# Patient Record
Sex: MALE | Race: OTHER | NOT HISPANIC OR LATINO
[De-identification: names, ages, dates, MRNs, and addresses within clinical notes are randomized per-mention and may not be internally consistent; named-entity substitution may affect disease eponyms.]

---

## 2017-01-30 ENCOUNTER — APPOINTMENT (OUTPATIENT)
Dept: OTOLARYNGOLOGY | Facility: CLINIC | Age: 22
End: 2017-01-30

## 2017-01-30 ENCOUNTER — EMERGENCY (EMERGENCY)
Facility: HOSPITAL | Age: 22
LOS: 1 days | Discharge: HOME | End: 2017-01-30
Attending: EMERGENCY MEDICINE | Admitting: EMERGENCY MEDICINE
Payer: MEDICAID

## 2017-01-30 VITALS
HEIGHT: 67 IN | WEIGHT: 125 LBS | DIASTOLIC BLOOD PRESSURE: 74 MMHG | BODY MASS INDEX: 19.62 KG/M2 | HEART RATE: 79 BPM | SYSTOLIC BLOOD PRESSURE: 118 MMHG

## 2017-01-30 VITALS
HEART RATE: 91 BPM | SYSTOLIC BLOOD PRESSURE: 123 MMHG | OXYGEN SATURATION: 100 % | DIASTOLIC BLOOD PRESSURE: 58 MMHG | RESPIRATION RATE: 16 BRPM | TEMPERATURE: 99 F

## 2017-01-30 DIAGNOSIS — R22.0 LOCALIZED SWELLING, MASS AND LUMP, HEAD: ICD-10-CM

## 2017-01-30 DIAGNOSIS — Q18.1 PREAURICULAR SINUS AND CYST: ICD-10-CM

## 2017-01-30 LAB
ALBUMIN SERPL ELPH-MCNC: 4.5 G/DL — SIGNIFICANT CHANGE UP (ref 3.3–5)
ALP SERPL-CCNC: 53 U/L — SIGNIFICANT CHANGE UP (ref 40–120)
ALT FLD-CCNC: 53 U/L — HIGH (ref 4–41)
AST SERPL-CCNC: 51 U/L — HIGH (ref 4–40)
BASE EXCESS BLDV CALC-SCNC: 4 MMOL/L — SIGNIFICANT CHANGE UP
BASOPHILS # BLD AUTO: 0.02 K/UL — SIGNIFICANT CHANGE UP (ref 0–0.2)
BASOPHILS NFR BLD AUTO: 0.4 % — SIGNIFICANT CHANGE UP (ref 0–2)
BILIRUB SERPL-MCNC: 0.4 MG/DL — SIGNIFICANT CHANGE UP (ref 0.2–1.2)
BLOOD GAS VENOUS - CREATININE: 0.86 MG/DL — SIGNIFICANT CHANGE UP (ref 0.5–1.3)
BUN SERPL-MCNC: 10 MG/DL — SIGNIFICANT CHANGE UP (ref 7–23)
CALCIUM SERPL-MCNC: 9.5 MG/DL — SIGNIFICANT CHANGE UP (ref 8.4–10.5)
CHLORIDE BLDV-SCNC: 102 MMOL/L — SIGNIFICANT CHANGE UP (ref 96–108)
CHLORIDE SERPL-SCNC: 99 MMOL/L — SIGNIFICANT CHANGE UP (ref 98–107)
CO2 SERPL-SCNC: 23 MMOL/L — SIGNIFICANT CHANGE UP (ref 22–31)
CREAT SERPL-MCNC: 0.87 MG/DL — SIGNIFICANT CHANGE UP (ref 0.5–1.3)
EOSINOPHIL # BLD AUTO: 0.27 K/UL — SIGNIFICANT CHANGE UP (ref 0–0.5)
EOSINOPHIL NFR BLD AUTO: 5 % — SIGNIFICANT CHANGE UP (ref 0–6)
GAS PNL BLDV: 140 MMOL/L — SIGNIFICANT CHANGE UP (ref 136–146)
GLUCOSE BLDV-MCNC: 82 — SIGNIFICANT CHANGE UP (ref 70–99)
GLUCOSE SERPL-MCNC: 79 MG/DL — SIGNIFICANT CHANGE UP (ref 70–99)
HCO3 BLDV-SCNC: 26 MMOL/L — SIGNIFICANT CHANGE UP (ref 20–27)
HCT VFR BLD CALC: 42 % — SIGNIFICANT CHANGE UP (ref 39–50)
HCT VFR BLDV CALC: 44.7 % — SIGNIFICANT CHANGE UP (ref 39–51)
HGB BLD-MCNC: 14.5 G/DL — SIGNIFICANT CHANGE UP (ref 13–17)
HGB BLDV-MCNC: 14.6 G/DL — SIGNIFICANT CHANGE UP (ref 13–17)
IMM GRANULOCYTES NFR BLD AUTO: 0.2 % — SIGNIFICANT CHANGE UP (ref 0–1.5)
LACTATE BLDV-MCNC: 1.1 MMOL/L — SIGNIFICANT CHANGE UP (ref 0.5–2)
LYMPHOCYTES # BLD AUTO: 1.48 K/UL — SIGNIFICANT CHANGE UP (ref 1–3.3)
LYMPHOCYTES # BLD AUTO: 27.6 % — SIGNIFICANT CHANGE UP (ref 13–44)
MCHC RBC-ENTMCNC: 31.6 PG — SIGNIFICANT CHANGE UP (ref 27–34)
MCHC RBC-ENTMCNC: 34.5 % — SIGNIFICANT CHANGE UP (ref 32–36)
MCV RBC AUTO: 91.5 FL — SIGNIFICANT CHANGE UP (ref 80–100)
MONOCYTES # BLD AUTO: 0.54 K/UL — SIGNIFICANT CHANGE UP (ref 0–0.9)
MONOCYTES NFR BLD AUTO: 10.1 % — SIGNIFICANT CHANGE UP (ref 2–14)
NEUTROPHILS # BLD AUTO: 3.04 K/UL — SIGNIFICANT CHANGE UP (ref 1.8–7.4)
NEUTROPHILS NFR BLD AUTO: 56.7 % — SIGNIFICANT CHANGE UP (ref 43–77)
PCO2 BLDV: 52 MMHG — HIGH (ref 41–51)
PH BLDV: 7.37 PH — SIGNIFICANT CHANGE UP (ref 7.32–7.43)
PLATELET # BLD AUTO: 221 K/UL — SIGNIFICANT CHANGE UP (ref 150–400)
PMV BLD: 9.4 FL — SIGNIFICANT CHANGE UP (ref 7–13)
PO2 BLDV: 27 MMHG — LOW (ref 35–40)
POTASSIUM BLDV-SCNC: 4 MMOL/L — SIGNIFICANT CHANGE UP (ref 3.4–4.5)
POTASSIUM SERPL-MCNC: 3.8 MMOL/L — SIGNIFICANT CHANGE UP (ref 3.5–5.3)
POTASSIUM SERPL-SCNC: 3.8 MMOL/L — SIGNIFICANT CHANGE UP (ref 3.5–5.3)
PROT SERPL-MCNC: 7.8 G/DL — SIGNIFICANT CHANGE UP (ref 6–8.3)
RBC # BLD: 4.59 M/UL — SIGNIFICANT CHANGE UP (ref 4.2–5.8)
RBC # FLD: 12.6 % — SIGNIFICANT CHANGE UP (ref 10.3–14.5)
SAO2 % BLDV: 41.6 % — LOW (ref 60–85)
SODIUM SERPL-SCNC: 141 MMOL/L — SIGNIFICANT CHANGE UP (ref 135–145)
WBC # BLD: 5.36 K/UL — SIGNIFICANT CHANGE UP (ref 3.8–10.5)
WBC # FLD AUTO: 5.36 K/UL — SIGNIFICANT CHANGE UP (ref 3.8–10.5)

## 2017-01-30 PROCEDURE — 99284 EMERGENCY DEPT VISIT MOD MDM: CPT

## 2017-01-30 RX ORDER — SODIUM CHLORIDE 9 MG/ML
1000 INJECTION INTRAMUSCULAR; INTRAVENOUS; SUBCUTANEOUS ONCE
Qty: 0 | Refills: 0 | Status: COMPLETED | OUTPATIENT
Start: 2017-01-30 | End: 2017-01-30

## 2017-01-30 RX ADMIN — SODIUM CHLORIDE 1000 MILLILITER(S): 9 INJECTION INTRAMUSCULAR; INTRAVENOUS; SUBCUTANEOUS at 18:47

## 2017-01-30 NOTE — ED PROVIDER NOTE - OBJECTIVE STATEMENT
22 y/o M with no pertinent medical history presents with abdominal pain and diarrhea x 3 days. Patient states that right now he does not have any abdominal pain and has not had any diarrhea today. Denies any sick contacts, no recent travel and no abx use. Patient has not had any blood in stool.

## 2017-01-30 NOTE — ED PROVIDER NOTE - ATTENDING CONTRIBUTION TO CARE
Case of a 20 y/o male patient with no pertinent medical history presented with abdominal pain and episodes of diarrhea. Patient well hydrated with no abdominal pain or tenderness upon evaluation. Symptoms most likely due to gastroenteritis, will check labs to r/o any electrolyte abnormality, hydrate and reassess. Agree with resident's physical exam, assessment and documentation

## 2017-01-30 NOTE — ED PROVIDER NOTE - MEDICAL DECISION MAKING DETAILS
22 y/o M with no pertinent medical history presents with abd pain and diarrhea x 3 days. Patient well appearing and has benign belly exam. Likely gastroenteritis. Will check basic labs and IVF

## 2017-01-30 NOTE — ED ADULT TRIAGE NOTE - CHIEF COMPLAINT QUOTE
pt c/o abd pain and diarrhea x 3 days. denies fever/chills. pt states has had similar incidents in the past but was never diagnosed with anything.

## 2017-04-07 ENCOUNTER — EMERGENCY (EMERGENCY)
Facility: HOSPITAL | Age: 22
LOS: 1 days | Discharge: HOME | End: 2017-04-07
Attending: EMERGENCY MEDICINE | Admitting: EMERGENCY MEDICINE
Payer: MEDICAID

## 2017-04-07 VITALS
SYSTOLIC BLOOD PRESSURE: 118 MMHG | TEMPERATURE: 98 F | HEART RATE: 72 BPM | DIASTOLIC BLOOD PRESSURE: 76 MMHG | RESPIRATION RATE: 16 BRPM | OXYGEN SATURATION: 100 %

## 2017-04-07 DIAGNOSIS — F12.10 CANNABIS ABUSE, UNCOMPLICATED: ICD-10-CM

## 2017-04-07 DIAGNOSIS — F43.20 ADJUSTMENT DISORDER, UNSPECIFIED: ICD-10-CM

## 2017-04-07 DIAGNOSIS — F13.10 SEDATIVE, HYPNOTIC OR ANXIOLYTIC ABUSE, UNCOMPLICATED: ICD-10-CM

## 2017-04-07 PROCEDURE — 90792 PSYCH DIAG EVAL W/MED SRVCS: CPT | Mod: GC

## 2017-04-07 PROCEDURE — 99284 EMERGENCY DEPT VISIT MOD MDM: CPT

## 2017-04-07 NOTE — ED BEHAVIORAL HEALTH ASSESSMENT NOTE - MODIFICATIONS
I interviewed the patient myself and discussed this patient with Dr. Garcia and agree with her assessment and plan, my own findings appear below:

## 2017-04-07 NOTE — ED BEHAVIORAL HEALTH ASSESSMENT NOTE - SAFETY PLAN DETAILS
Patient is to return to ED or call 911 if symptoms worsen or if endorsing suicidal/homicidal ideation, intent or plan

## 2017-04-07 NOTE — ED ADULT TRIAGE NOTE - CHIEF COMPLAINT QUOTE
brought in by EMS from home. Ex-girlfriend called 911, stating that pt voiced to her that he wanted to kill himself. Pt denies saying anything of that sort. Pt calm cooperative. No past medical hx, no past psych hx. As per EMS, by law they cannot keep him in his home and had to bring pt here for eval. Pt denies any complaints
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
## 2017-04-07 NOTE — ED PROVIDER NOTE - NS ED MD SCRIBE ATTENDING SCRIBE SECTIONS
DISPOSITION/VITAL SIGNS( Pullset)/PHYSICAL EXAM/HISTORY OF PRESENT ILLNESS/PAST MEDICAL/SURGICAL/SOCIAL HISTORY/HIV/REVIEW OF SYSTEMS

## 2017-04-07 NOTE — ED BEHAVIORAL HEALTH ASSESSMENT NOTE - DETAILS
currently with mother Per ex-gf, patient often makes statements like "I'm going to kill myself if we aren't together" however has not acted on them in the past family/ex-gf aware

## 2017-04-07 NOTE — ED BEHAVIORAL HEALTH ASSESSMENT NOTE - RISK ASSESSMENT
The patient's risk factors include substance use, legal hx, poor coping skills, psychosocial stressors and lack of current treatment. Protective factors include no prior psychiatric hospitalizations, no hx of SA, domiciled, good family support, enrolled in school, employed, future oriented and treatment seeking. At this time, patient's protective factors outweigh his risk factors and he does not appear to be an imminent risk of harm to himself and/or others.

## 2017-04-07 NOTE — ED PROVIDER NOTE - OBJECTIVE STATEMENT
20 y/o M, no sig PMHx, brought in by [facility] due to girlfriend calling [facility] stating pt was suicidal. Denies CP, abdominal pain, toxic overdoses, SI/HI, and other complaints.

## 2017-04-07 NOTE — ED BEHAVIORAL HEALTH ASSESSMENT NOTE - CASE SUMMARY
Mr. 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 Collateral obtained by Dr. Garcia suggests patient is having a difficult time adjusting to breakup with former GF, but does not have history of suicidality nor has he expressed any plans. Patient is generally minimizing, but presents with organized thought process and denies AVH/HI/SI and denies hx of SA. he is not interested in inpatient level of care nor does he desire  referral. He is working on finding referral for therapy through his insurance company. Provided brief counselling on substance use. Father does not have acute safety concern and is comfortable taking pt home. both father and patient agree to call 911/return to ED for SI/HI.

## 2017-04-07 NOTE — ED BEHAVIORAL HEALTH ASSESSMENT NOTE - SUMMARY
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  On interview, patient is calm/cooperative, and reports that over the past month he has been stressed about his recent break up and overwhelming school work, which has led him to have some difficulty sleeping, crying and anxiety. However, patient states that he is not overtly depressed and consistently denies any passive or active SIIP. States that he has been using marijuana/xanax however feels he does it to enjoy rather than cope with stress. Per father and ex-gf, patient has been more down recently and they have noticed that he does use more marijuana, which is likely to cope with the stress. Deny that patient has ever tried to hurt himself and per ex-gf, patient has made these statements in a threatening manner ("I will kill myself if we aren't together") but denies that patient has ever acted on them. Patient is in agreement that he may need some counseling/therapy and is open to the idea. Pt denies any overt depressive, manic or psychotic sx. No SIIP/HIIP. It appears that patient has low frustration tolerance, dependency traits, poor coping skills and affective instability, which may be contributing to current presentation. Patient was offered voluntary hospitalization which he refused. Pt also offered  referral however refused, stating that he has already called his insurance to find out who is covered. At this time, patient is not an imminent risk of harm to himself and/or others and does not require involuntary hospitalization for further stabilization. 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  On interview, patient is calm/cooperative, and reports that over the past month he has been stressed about his recent break up and overwhelming school work, which has led him to have some difficulty sleeping, crying and anxiety. However, patient states that he is not overtly depressed and consistently denies any passive or active SIIP. States that he has been using marijuana/xanax however feels he does it to enjoy rather than cope with stress. Per father and ex-gf, patient has been more down recently and they have noticed that he does use more marijuana, which is likely to cope with the stress. Deny that patient has ever tried to hurt himself and per ex-gf, patient has made these statements in a threatening manner ("I will kill myself if we aren't together") but denies that patient has ever acted on them. Patient is in agreement that he may need some counseling/therapy and is open to the idea. Pt denies any overt depressive, manic or psychotic sx. No SIIP/HIIP. It appears that patient has low frustration tolerance, dependency traits, poor coping skills and affective instability, which may be contributing to current presentation. Patient was offered voluntary hospitalization which he refused. Pt also offered  referral however refused, stating that he has already called his insurance to find out who is covered. At this time, patient is not an imminent risk of harm to himself and/or others and does not require involuntary hospitalization for further stabilization.

## 2017-04-07 NOTE — ED BEHAVIORAL HEALTH ASSESSMENT NOTE - REFERRAL / APPOINTMENT DETAILS
Pt offered  referral but refused. States that he has already called his insurance company for referrals. Provided [facility] contact [phone] for outpt follow up if insurance company does not work out

## 2017-04-24 ENCOUNTER — APPOINTMENT (OUTPATIENT)
Dept: OTOLARYNGOLOGY | Facility: CLINIC | Age: 22
End: 2017-04-24

## 2020-01-01 ENCOUNTER — OUTPATIENT (OUTPATIENT)
Dept: OUTPATIENT SERVICES | Facility: HOSPITAL | Age: 25
LOS: 1 days | End: 2020-01-01
Payer: MEDICAID

## 2020-01-01 PROCEDURE — G9001: CPT

## 2020-01-23 ENCOUNTER — EMERGENCY (EMERGENCY)
Facility: HOSPITAL | Age: 25
LOS: 1 days | Discharge: HOME | End: 2020-01-23
Attending: EMERGENCY MEDICINE | Admitting: EMERGENCY MEDICINE
Payer: MEDICAID

## 2020-01-23 VITALS
SYSTOLIC BLOOD PRESSURE: 142 MMHG | HEART RATE: 112 BPM | RESPIRATION RATE: 18 BRPM | OXYGEN SATURATION: 100 % | DIASTOLIC BLOOD PRESSURE: 78 MMHG | TEMPERATURE: 98 F

## 2020-01-23 VITALS
SYSTOLIC BLOOD PRESSURE: 146 MMHG | DIASTOLIC BLOOD PRESSURE: 77 MMHG | TEMPERATURE: 98 F | OXYGEN SATURATION: 100 % | RESPIRATION RATE: 18 BRPM | HEART RATE: 89 BPM

## 2020-01-23 PROCEDURE — 99283 EMERGENCY DEPT VISIT LOW MDM: CPT

## 2020-01-23 RX ORDER — IVERMECTIN 3 MG/1
12 TABLET ORAL
Qty: 8 | Refills: 0
Start: 2020-01-23

## 2020-01-23 RX ORDER — PERMETHRIN CREAM 5% W/W 50 MG/G
1 CREAM TOPICAL
Qty: 1 | Refills: 0
Start: 2020-01-23 | End: 2020-01-23

## 2020-01-23 NOTE — ED ADULT TRIAGE NOTE - CHIEF COMPLAINT QUOTE
Pt coming into ED for evaluation of a pruritic rash spreading across his whole body, pt states the rash started in his abdomen. Pt was seen last week for this rash and was prescribed, pepcid, benadryl and prednisone with no relief of symptoms.

## 2020-01-23 NOTE — ED PROVIDER NOTE - ATTENDING CONTRIBUTION TO CARE
DR. BLOCH, ATTENDING MD-  I performed a face to face bedside interview with patient regarding history of present illness, review of symptoms and past medical history. I completed an independent physical exam.  I have discussed patient's plan of care with the resident.  Patient examined, well appearing NAD HEENT nml lungs clear, abd soft nontender. skin with excoriated, macular papular rash  worse in intertrigo area and buttocks DR. BLOCH, ATTENDING MD-  I performed a face to face bedside interview with patient regarding history of present illness, review of symptoms and past medical history. I completed an independent physical exam.  I have discussed patient's plan of care with the resident.  Patient examined, well appearing NAD HEENT nml lungs clear, abd soft nontender. skin with excoriated, macular papular rash  worse in intertrigo area and buttocks.

## 2020-01-23 NOTE — ED ADULT NURSE NOTE - OBJECTIVE STATEMENT
24 year old male presents to the ED with c/o rash to his entire body except his face x 2 weeks was seen for the rash and given meds without relief

## 2020-01-23 NOTE — ED ADULT NURSE NOTE - NSIMPLEMENTINTERV_GEN_ALL_ED
Implemented All Universal Safety Interventions:  [place] to call system. Call bell, personal items and telephone within reach. Instruct patient to call for assistance. Room bathroom lighting operational. Non-slip footwear when patient is off stretcher. Physically safe environment: no spills, clutter or unnecessary equipment. Stretcher in lowest position, wheels locked, appropriate side rails in place.

## 2020-01-23 NOTE — ED ADULT NURSE NOTE - CAS DISCH ACCOMP BY
Problem: Pain:  Goal: Control of chronic pain  Description  Control of chronic pain  Outcome: Met This Shift  Note:   Patient describes pain as more of ache and states usually doesn't even take anything for it  Intervention: Opioid analgesia side-effects  Note:   Patient only using plain tylenol and or motrin for pain     Problem: Musculor/Skeletal Functional Status  Goal: Absence of falls  Outcome: Met This Shift  Note:   No falls this admission   Intervention: Fall precautions  Note:   Patient aware of fall precautions for here and at home -call light in reach while here       Problem: Intellectual/Education/Knowledge Deficit  Goal: Teaching initiated upon admission  Outcome: Met This Shift  Note:   Chemotherapy Teaching     What is Chemotherapy   Drug action ? Method of Administration ? Handouts given ? Side Effects  Nausea/vomiting ? Diarrhea ? Fatigue ? Signs / Symptoms of infection ? Neutropenia ? Thrombocytopenia ? Alopecia ? neuropathy ? Jack diet &  the importance of fluids ? Micellaneous  Importance of nutrition ? Importance of oral hygiene ? When to call the MD ?   Monitoring labs ? Use of supportive services ? Explanation of Drug Regimen / Frequency  Velcade subcutaneous      Comments  Verbalized understanding to drug,action,side effects and when to call MD      Goal: Written Disposition Instruction form completed  Outcome: Met This Shift  Note:   Discharge instructions given and reviewed with patient. All questions answered. Patient verbalized understanding   Intervention: Verbal/written education provided  Note:   Discharge instruction sheets     Problem: Discharge Planning  Goal: Knowledge of discharge instructions  Description  Knowledge of discharge instructions     Outcome: Met This Shift  Note:   Patient and family member able to teach back follow up appointments and when to call the doctor.  Patient offers no questions at this time   Intervention: Interaction with patient/family and care team  Note:   Patient and family currently denies any needs or concerns    Intervention: Discharge to appropriate level of care  Note:   Discharge home Self

## 2020-01-23 NOTE — ED PROVIDER NOTE - PHYSICAL EXAMINATION
GENERAL: anxious appearing  HEAD: normocephalic, atraumatic  HEENT: normal conjunctiva, oral mucosa moist without any signs of lesions, neck supple  CARDIAC: regular rate and rhythm, normal S1 and S2,  no appreciable murmurs  PULM: clear to ascultation bilaterally  GI: abdomen nondistended, soft, nontender, no guarding or rebound tenderness  : no CVA tenderness, no suprapubic tenderness  NEURO: alert and oriented x 3, normal speech, PERRL, no focal motor or sensory deficits, nonantalgic gait  MSK: no visible deformities, no peripheral edema, calf tenderness/redness/swelling  SKIN: diffuse maculopapular rash with evidence of excoriation sparing palms and soles, erythematous/lichenified rash at cleft of buttocks with excoriations, maculopapular rash involving groin with pupules vs. vesicles on head of penis  PSYCH: pressured speech, anxious appearing

## 2020-01-23 NOTE — ED PROVIDER NOTE - CLINICAL SUMMARY MEDICAL DECISION MAKING FREE TEXT BOX
24M p/w diffuse pruritic rash for past two weeks worsening despite prednisone/benadryl, worse at night and in morning, sparing palms and soles and mucosa, worst in cleft of buttocks and with papules on head of penis that aren't painful. Vitals reassuring. Possibly fungal as pt states it's gotten worse despite prednisone though doesn't appear to be candidiasis. Possibly scabies vs. bed bugs. Possibly secondary syphillis vs. HIV related vs HSV vs. GC/CT related. Will do those tests. Try to contact derm.
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
## 2020-01-23 NOTE — CHART NOTE - NSCHARTNOTEFT_GEN_A_CORE
Dermatology Brief Note    HPI: 24 year old M presenting with very itchy rash that started on abdomen 2 weeks ago and  spread to groin, buttock, and thighs. Patient went to [facility] ED 1 week ago and received prednisone, famotidine, and benadryl without improvement. Nobody else at home itchy as per patient.     PHYSICAL EXAM:  Vital Signs Last 24 Hrs  T(C): 36.9 (23 Jan 2020 16:20), Max: 36.9 (23 Jan 2020 12:36)  T(F): 98.4 (23 Jan 2020 16:20), Max: 98.5 (23 Jan 2020 12:36)  HR: 89 (23 Jan 2020 16:20) (89 - 112)  BP: 146/77 (23 Jan 2020 16:20) (142/78 - 146/77)  BP(mean): --  RR: 18 (23 Jan 2020 16:20) (18 - 18)  SpO2: 100% (23 Jan 2020 16:20) (100% - 100%)    Skin exam notable for:  The patient was alert and oriented X 3, well nourished, and in no apparent distress. Oropharynx showed no ulcerations. There was no visible lymphadenopathy. Conjunctiva were non-injected. There was no clubbing or edema of extremities.    The scalp, hair, face, eyebrows, lips, oropharynx , neck, chest, back, buttocks, extremities X 4, hands, feet, nails were examined. There was no hyperhidrosis or bromhidrosis.     The following lesions are noted:   multiple erythematous papules with excoriations on penis, scrotum, buttock/intergluteal cleft, abdomen, umbilicus, thighs    ASSESSMENT/PLAN:  #Scabies  - Start permethrin cream, apply from neck down at night (leave on for 8-12 hours prior to rinsing), repeat application in 1 week  - Start ivermectin 200mcg/kg x 1 dose, repeat dose in 1 week  - Clothing and bedding should be washed in hot water and dried under high heat     Discussed with primary team.  Discussed remotely with attending, Dr. Cierra Junior.     Rachel Smith MD  PGY3, Dermatology

## 2020-01-23 NOTE — ED PROVIDER NOTE - NSFOLLOWUPINSTRUCTIONS_ED_ALL_ED_FT
Your diagnosis: Rash (suspected Scabies)    Discharge instructions:    - Please follow up with your Primary Care Doctor and/or Dermatologist.    - Take prescribed medications as indicated:      - Permethrin 5% cream applied to entire body below neck and wash off after 8-14 hours THEN repeat in 1 week.       - Ivermectin 12mg once this week THEN repeat same dose in 1 week.     - Wash clothing and bedding in hot water, dry-clean or place in airtight bag for 72 hours.    - Be sure to return to the ED if you develop new or worsening symptoms. Specific signs and symptoms to be vigilant of: fever or chills, chest pain, difficulty breathing, palpitations, loss of consciousness, headache, vision changes, slurred speech, difficulty swallowing or drooling, facial droop, weakness in the arms or legs, numbness or tingling, abdominal pain, nausea or vomiting, diarrhea, constipation, blood in the stool or urine, pain on urination, difficulty urinating or any other distressing symptoms.

## 2020-01-23 NOTE — ED PROVIDER NOTE - PATIENT PORTAL LINK FT
You can access the FollowMyHealth Patient Portal offered by [facility] by registering at the following website: [email or web address]. By joining [employer]’s FollowMyHealth portal, you will also be able to view your health information using other applications (apps) compatible with our system.

## 2020-01-23 NOTE — SBIRT NOTE ADULT - NSSBIRTBRIEFINTDET_GEN_A_CORE
Provided SBIRT services: Full screen positive. Brief Intervention Performed. Screening results were reviewed with the patient and patient was provided information about healthy guidelines and potential negative consequences associated with level of risk. Motivation and readiness to reduce or stop use was discussed and goals and activities to make changes were suggested/offered.  Provided pt with information for Bottle Cap- online program to help pts reduce/stop drinking

## 2020-01-24 DIAGNOSIS — Z71.89 OTHER SPECIFIED COUNSELING: ICD-10-CM

## 2020-01-24 LAB — HIV 1+2 AB+HIV1 P24 AG SERPL QL IA: SIGNIFICANT CHANGE UP

## 2020-01-24 RX ORDER — IVERMECTIN 3 MG/1
12 TABLET ORAL
Qty: 8 | Refills: 0
Start: 2020-01-24

## 2020-01-24 RX ORDER — PERMETHRIN CREAM 5% W/W 50 MG/G
1 CREAM TOPICAL
Qty: 1 | Refills: 0
Start: 2020-01-24 | End: 2020-01-24

## 2020-01-24 NOTE — ED POST DISCHARGE NOTE - REASON FOR FOLLOW-UP
Other Pt called rx not at pharmacy for permethrin and ivermectin. Re Erx prescriptions as directed by provider. status shows successful transmission

## 2020-01-30 ENCOUNTER — APPOINTMENT (OUTPATIENT)
Dept: DERMATOLOGY | Facility: CLINIC | Age: 25
End: 2020-01-30

## 2020-02-01 ENCOUNTER — OUTPATIENT (OUTPATIENT)
Dept: OUTPATIENT SERVICES | Facility: HOSPITAL | Age: 25
LOS: 1 days | End: 2020-02-01

## 2020-02-19 DIAGNOSIS — Z71.89 OTHER SPECIFIED COUNSELING: ICD-10-CM

## 2022-12-07 ENCOUNTER — EMERGENCY (EMERGENCY)
Facility: HOSPITAL | Age: 27
LOS: 1 days | Discharge: HOME | End: 2022-12-07
Attending: EMERGENCY MEDICINE
Payer: COMMERCIAL

## 2022-12-07 VITALS
WEIGHT: 130.07 LBS | DIASTOLIC BLOOD PRESSURE: 63 MMHG | TEMPERATURE: 100 F | RESPIRATION RATE: 20 BRPM | HEART RATE: 92 BPM | OXYGEN SATURATION: 96 % | SYSTOLIC BLOOD PRESSURE: 104 MMHG | HEIGHT: 67 IN

## 2022-12-07 PROCEDURE — 99284 EMERGENCY DEPT VISIT MOD MDM: CPT

## 2022-12-07 PROCEDURE — 0225U NFCT DS DNA&RNA 21 SARSCOV2: CPT

## 2022-12-07 RX ORDER — ONDANSETRON 8 MG/1
4 TABLET, FILM COATED ORAL ONCE
Refills: 0 | Status: COMPLETED | OUTPATIENT
Start: 2022-12-07 | End: 2022-12-07

## 2022-12-07 RX ORDER — ACETAMINOPHEN 500 MG
650 TABLET ORAL ONCE
Refills: 0 | Status: COMPLETED | OUTPATIENT
Start: 2022-12-07 | End: 2022-12-07

## 2022-12-07 RX ADMIN — ONDANSETRON 4 MILLIGRAM(S): 8 TABLET, FILM COATED ORAL at 22:57

## 2022-12-07 RX ADMIN — Medication 650 MILLIGRAM(S): at 22:57

## 2022-12-07 NOTE — ED PROVIDER NOTE - OBJECTIVE STATEMENT
27-year-old male with no past medical history [facility] ED complaining of fever, body aches, nausea and severe back pain for last 4 days.  Denies vomiting, chest pain, shortness of breath, abdominal pain, vomiting, dysuria, diarrhea.  Patient is active smoker.

## 2022-12-07 NOTE — ED PROVIDER NOTE - PATIENT PORTAL LINK FT
You can access the FollowMyHealth Patient Portal offered by [facility] by registering at the following website: [email or web address]. By joining [employer]’s FollowMyHealth portal, you will also be able to view your health information using other applications (apps) compatible with our system.

## 2022-12-07 NOTE — ED PROVIDER NOTE - PHYSICAL EXAMINATION
GEN: Patient awake alert NAD.   HEENT: normocephalic, atraumatic, moist MM  CARDIAC: RRR, S1, S2, no murmur.   PULM: CTA B/L no wheeze, rhonchi, rales.   ABD: soft NT, ND, no rebound no guarding, no CVA tenderness.   MSK: Moving all extremities, no edema.    NEURO: A&Ox3, no focal neurological deficits  SKIN: warm, dry, no rash.

## 2022-12-07 NOTE — ED PROVIDER NOTE - ATTENDING CONTRIBUTION TO CARE
Pt with URI sx - exam is benign with unremarkable vitals, no signs of respiratory compromise, hypoxia, or sepsis. Pt tested + for influenza. No indication for inpatient admission at this point. Pt given supportive care instructions and isolation instructions pt is well appearing and stable for d/c with supportive care as outpt.

## 2022-12-07 NOTE — ED PROVIDER NOTE - NS ED ROS FT
GENERAL: + fever, chills  EYES: no vision changes, no discharge.   ENT: no difficulty swallowing or speaking   CARDIAC: no chest pain/pressure, SOB, lower extremity swelling  PULMONARY: no cough, SOB  GI: no abdominal pain, v/d, + nausea  : no dysuria, no hematuria  SKIN: no rashes, no ecchymosis  NEURO: no headache, lightheadedness  MSK: No joint pain, +myalgia, weakness.

## 2022-12-07 NOTE — ED ADULT TRIAGE NOTE - CHIEF COMPLAINT QUOTE
C/o chills, body aches, non productive cough, L flank pain x1 day. Endorses fever (max 100.7). Denies  symptoms

## 2022-12-07 NOTE — ED PROVIDER NOTE - NSFOLLOWUPINSTRUCTIONS_ED_ALL_ED_FT
You were evaluated in the Emergency Department for your symptoms. You have influenza causing an upper respiratory infection.     While you may continue to feel sick for a few days,  fortunatelly there is no need to be hospitalized at this time and you are safe to continue your treatment at home.    We recommend that you:  1. Continue your home medications as prescribed.  2. Take Tylenol, 2 extra strength tablets, up to every 6 hours as needed for pain or any fever.  3. Drink plenty of fluids.  4. Call your primary care doctor tomorrow for follow-up.  5. Avoid contact with people, wash your hands thoroughly and often, and cover your mouth when coughing or sneezing to prevent the spread of the illness to others.    *** Return immediately (or call 911) if you have increased difficulty breathing, vomiting, chest pain, or develop other new/concerning symptoms. ***

## 2022-12-07 NOTE — ED PROVIDER NOTE - RAPID ASSESSMENT
28 y/o M here for 4 day fever (TMAX 100.4 F), chills, body ache, and nausea, along with lower back pain today. Endorsed Tylenol PTA. Has a daughter at home with RSVP. No other acute complaints. SANDEE.     Junie CANDELARIO (Scribkurt) have documented this rapid assessment note under the dictation of Eri Perry) which has been reviewed and affirmed to be accurate. Patient was seen as a QDOC patient. The patient will be seen and further worked up in the main emergency department and their care will be completed by the main emergency department team along with a thorough physical exam. Receiving team will follow up on labs, analgesia, any clinical imaging, reassess and disposition as clinically indicated, all decisions regarding the progression of care will be made at their discretion.

## 2022-12-08 VITALS
DIASTOLIC BLOOD PRESSURE: 65 MMHG | TEMPERATURE: 99 F | OXYGEN SATURATION: 99 % | SYSTOLIC BLOOD PRESSURE: 112 MMHG | RESPIRATION RATE: 17 BRPM | HEART RATE: 88 BPM

## 2022-12-08 LAB
FLUAV H1 2009 PAND RNA SPEC QL NAA+PROBE: DETECTED
RAPID RVP RESULT: DETECTED
SARS-COV-2 RNA SPEC QL NAA+PROBE: SIGNIFICANT CHANGE UP

## 2022-12-08 RX ORDER — ONDANSETRON 8 MG/1
1 TABLET, FILM COATED ORAL
Qty: 12 | Refills: 0
Start: 2022-12-08

## 2022-12-08 NOTE — ED ADULT NURSE NOTE - OBJECTIVE STATEMENT
Pt received AA+O x 4, presents to ED c/o Fever, chills, lower back pain, and cough for 3 days that worsened today.

## 2022-12-08 NOTE — ED ADULT NURSE NOTE - ALCOHOL PRE SCREEN (AUDIT - C)
[facility] Prior Authorization Team   Phone: [phone]  Fax: [phone]    PA Initiation    Medication: PULMOZYME 1 MG/ML neb solution  Insurance Company: [employer] - Phone [phone] Fax [phone]  Pharmacy Filling the Rx: [facility] - [place], [place] - [facility] [address]  Filling Pharmacy Phone: [phone]  Filling Pharmacy Fax: [phone]  Start Date: 12/13/2017       Statement Selected

## 2022-12-08 NOTE — ED ADULT NURSE NOTE - NS ED NOTE  TALK SOMEONE YN
Patient to lab for Q4 week lab h/h  and possible Aranesp injection. Also renal fxn panel per Dr Coty Solitario  Pt is not on dialysis, he has kidney disease, anemia. Sees Dr Coty Solitario. Periph lab drawn, tolerated well    HGB = 10.0  Patient has complaint mild fatigue  Chronic pains; has some generalized pain though d/t stressors in life, chronic body aches x 25 yrs  Emotional support offered. Rates to lower legs, back =4- 5/10 now. Aranesp inj given today left upper arm SQ - to be given if Hgl is less than 11. Inj tolerated well. Will return in 4 weeks. Patient verbalizes understanding of Aranesp injection and HH monitoring prior to each dose.      Discharged stable & ambulatory with cane, with next appointments, provided updated AVS.
No

## 2022-12-08 NOTE — ED ADULT NURSE NOTE - NSICDXPASTMEDICALHX_GEN_ALL_CORE_FT
PAST MEDICAL HISTORY:  No pertinent past medical history     Seasonal allergies     Vitamin D deficiency

## 2022-12-15 ENCOUNTER — EMERGENCY (EMERGENCY)
Facility: HOSPITAL | Age: 27
LOS: 1 days | Discharge: HOME | End: 2022-12-15
Attending: STUDENT IN AN ORGANIZED HEALTH CARE EDUCATION/TRAINING PROGRAM
Payer: COMMERCIAL

## 2022-12-15 VITALS
RESPIRATION RATE: 16 BRPM | DIASTOLIC BLOOD PRESSURE: 73 MMHG | SYSTOLIC BLOOD PRESSURE: 123 MMHG | TEMPERATURE: 98 F | HEIGHT: 67 IN | WEIGHT: 130.07 LBS | OXYGEN SATURATION: 99 % | HEART RATE: 96 BPM

## 2022-12-15 PROCEDURE — 71045 X-RAY EXAM CHEST 1 VIEW: CPT | Mod: 26

## 2022-12-15 PROCEDURE — 99284 EMERGENCY DEPT VISIT MOD MDM: CPT

## 2022-12-15 PROCEDURE — 71045 X-RAY EXAM CHEST 1 VIEW: CPT

## 2022-12-15 PROCEDURE — 99283 EMERGENCY DEPT VISIT LOW MDM: CPT | Mod: 25

## 2022-12-15 RX ORDER — DIAZEPAM 5 MG
2 TABLET ORAL ONCE
Refills: 0 | Status: DISCONTINUED | OUTPATIENT
Start: 2022-12-15 | End: 2022-12-15

## 2022-12-15 RX ORDER — ACETAMINOPHEN 500 MG
975 TABLET ORAL ONCE
Refills: 0 | Status: COMPLETED | OUTPATIENT
Start: 2022-12-15 | End: 2022-12-15

## 2022-12-15 RX ORDER — IBUPROFEN 200 MG
600 TABLET ORAL ONCE
Refills: 0 | Status: COMPLETED | OUTPATIENT
Start: 2022-12-15 | End: 2022-12-15

## 2022-12-15 RX ADMIN — Medication 2 MILLIGRAM(S): at 03:56

## 2022-12-15 RX ADMIN — Medication 975 MILLIGRAM(S): at 03:56

## 2022-12-15 RX ADMIN — Medication 600 MILLIGRAM(S): at 03:56

## 2022-12-15 NOTE — ED PROVIDER NOTE - PATIENT PORTAL LINK FT
You can access the FollowMyHealth Patient Portal offered by [facility] by registering at the following website: [email or web address]. By joining [employer]’s FollowMyHealth portal, you will also be able to view your health information using other applications (apps) compatible with our system.

## 2022-12-15 NOTE — ED PROVIDER NOTE - NSFOLLOWUPINSTRUCTIONS_ED_ALL_ED_FT
Please take Tylenol 1000mg every 6 hours, as needed for pain.   If pain not controlled with Tylenol alone, you may take 400-600mg ibuprofen every 6 hours, as needed for pain.     Back Pain    Back pain is very common in adults. The cause of back pain is rarely dangerous and the pain often gets better over time. The cause of your back pain may not be known and may include strain of muscles or ligaments, degeneration of the spinal disks, or arthritis. Occasionally the pain may radiate down your leg(s). Over-the-counter medicines to reduce pain and inflammation are often the most helpful. Stretching and remaining active frequently helps the healing process.     Low Back Strain  A strain is a stretch or tear in a muscle or the strong cords of tissue that attach muscle to bone (tendons). Strains of the lower back (lumbar spine) are a common cause of low back pain. A strain occurs when muscles or tendons are torn or are stretched beyond their limits. The muscles may become inflamed, resulting in pain and sudden muscle tightening (spasms). A strain can happen suddenly due to an injury (trauma), or it can develop gradually due to overuse.    What increases the risk?  The following factors may increase your risk of getting this condition:  Playing contact sports.  Participating in sports or activities that put excessive stress on the back and require a lot of bending and twisting, including:  Lifting weights or heavy objects, Gymnastics, Soccer, Figure skating, Snowboarding, Being overweight or obese, Having poor strength and flexibility.    What are the signs or symptoms?  Symptoms of this condition may include:  Sharp or dull pain in the lower back that does not go away. Pain may extend to the buttocks.  Stiffness.  Limited range of motion.  Inability to stand up straight due to stiffness or pain.  Muscle spasms.    How is this diagnosed?  This condition may be diagnosed based on:  Your symptoms, Your medical history, A physical exam, Your health care provider may push on certain areas of your back to determine the source of your pain. You may be asked to bend forward, backward, and side to side to assess the severity of your pain and your range of motion.  Imaging tests, such as:  X-rays, MRI.    How is this treated?  Treatment for this condition may include:  Applying heat and cold to the affected area.  Medicines to help relieve pain and to relax your muscles (muscle relaxants).  NSAIDs to help reduce swelling and discomfort.  Physical therapy.  When your symptoms improve, it is important to gradually return to your normal routine as soon as possible to reduce pain, avoid stiffness, and avoid loss of muscle strength. Generally, symptoms should improve within 6 weeks of treatment. However, recovery time varies.    Follow these instructions at home:  Managing pain, stiffness, and swelling     If directed, apply ice to the injured area during the first 24 hours after your injury.  Put ice in a plastic bag.  Place a towel between your skin and the bag.  Leave the ice on for 20 minutes, 2–3 times a day.  If directed, apply heat to the affected area as often as told by your health care provider. Use the heat source that your health care provider recommends, such as a moist heat pack or a heating pad.  Place a towel between your skin and the heat source.  Leave the heat on for 20–30 minutes.  Remove the heat if your skin turns bright red. This is especially important if you are unable to feel pain, heat, or cold. You may have a greater risk of getting burned.    Activity   Rest and return to your normal activities as told by your health care provider. Ask your health care provider what activities are safe for you.  Avoid activities that take a lot of effort (are strenuous) for as long as told by your health care provider.  Do exercises as told by your health care provider.  General instructions     Take over-the-counter and prescription medicines only as told by your health care provider.  If you have questions or concerns about safety while taking pain medicine, talk with your health care provider.  Do not drive or operate heavy machinery until you know how your pain medicine affects you.  Do not use any tobacco products, such as cigarettes, chewing tobacco, and e-cigarettes. Tobacco can delay bone healing. If you need help quitting, ask your health care provider.  Keep all follow-up visits as told by your health care provider. This is important.    How is this prevented?  Warm up and stretch before being active.  Cool down and stretch after being active.  Give your body time to rest between periods of activity.    Avoid:  Being physically inactive for long periods at a time.  Exercising or playing sports when you are tired or in pain.  Use correct form when playing sports and lifting heavy objects.  Use good posture when sitting and standing.  Maintain a healthy weight.  Sleep on a mattress with medium firmness to support your back.  Make sure to use equipment that fits you, including shoes that fit well.  Be safe and responsible while being active to avoid falls.  Do at least 150 minutes of moderate-intensity exercise each week, such as brisk walking or water aerobics. Try a form of exercise that takes stress off your back, such as swimming or stationary cycling.  Maintain physical fitness, including:  Strength.  Flexibility.  Cardiovascular fitness.  Endurance.  Contact a health care provider if:  Your back pain does not improve after 6 weeks of treatment.  Your symptoms get worse.  Get help right away if:  Your back pain is severe.  You are unable to stand or walk.  You develop pain in your legs.  You develop weakness in your buttocks or legs.  You have difficulty controlling when you urinate or when you have a bowel movement.  This information is not intended to replace advice given to you by your health care provider. Make sure you discuss any questions you have with your health care provider.      SEEK IMMEDIATE MEDICAL CARE IF YOU HAVE ANY OF THE FOLLOWING SYMPTOMS: bowel or bladder control problems, unusual weakness or numbness in your arms or legs, nausea or vomiting, abdominal pain, fever, dizziness/lightheadedness.

## 2022-12-15 NOTE — ED PROVIDER NOTE - CLINICAL SUMMARY MEDICAL DECISION MAKING FREE TEXT BOX
27-year-old male with back pain and no red flags for cord compression.  No trauma back spasm 4 days left since similar to previous episode of back pain/spasm.  No incontinence, no paresthesia, fever/chills, no IVDU, no trauma.  AVSS, positive left back tenderness in the paravertebral muscle body spasm.  Will give pain meds and reassess, CXR to rule out PNA after recent viral illness last week.  Will reassess.

## 2022-12-15 NOTE — ED PROVIDER NOTE - OBJECTIVE STATEMENT
27-year-old male with no past medical history, previous episodes of back spasm, presents with left back pain for last 4 days.  Patient states in the past he has had back spasms similar to this only lasting about a day or 2.  Presenting due to prolonged length of time of back spasm length over 4 days.  Pain is left lower extending up to scapula feels achy spasming, does respond moderately well to Tylenol, however recurs after 4 hours.  Patient admits to applying icy hot on the area and smoking marijuana prior to arrival at the ED today with only mild relief in pain.  Denies fever/chills, CP, SOB, abdominal pain, N/V/D, dysuria, IVDU.

## 2022-12-15 NOTE — ED PROVIDER NOTE - ATTENDING CONTRIBUTION TO CARE
Attending (Blaze Garcia D.O.):  I have personally seen and examined this patient. I have performed a substantive portion of the visit including all aspects of the medical decision making. Resident, fellow, student, and/or ACP note reviewed. I agree on the plan of care except where noted.    27-year-old male recently flu positive on 12/7 here for atraumatic left scapular region spasm-like pain.  Per patient this usually occurs at night and has happened before.  Patient has tried Tylenol with improvement in pain however pain recurs.  Denies any trouble ambulating, difficulty with urinary or bowel habits, fever, chills, IV drug use.  Still able to tolerate p.o.  Hemodynamically stable. Patient aaox4 to person, place, time, event. CNs intact w/ symm facies, PERRL 3mm, EOMI w/o nystagmus, normal phonation. 5/5 str all 4 extrem w/ intact sensation to touch. Well coordinated. No drift. Steady gait. 2+ distal pulses, equal b/l. +left mid and upper paraspinal and medial scapular muscle hypertoncicity suggestive of back spasm. No midline spinal tenderness. No signs of symptoms to suggest infectious process. Clear lungs, unlikely PNA or other pulmonary process at this time. Will trial antispasmodic, analgesia, reassess. DDx and lack of utility of lab testing being ordered discussed w/ patient. No current indication for CT imaging. Verbal understanding expressed. Agreeable to plan. -> patient with improvement in pain and less hypertonicity. Remainder of exam unchanged. Will have patient continue supportive care at home with heat/ice as needed, rest, avoid straining. All w/u, results discussed with patient at length. All q answered. Strict return precautions given with verbal understanding expressed. Stable for dc with close outpt f/u.

## 2022-12-15 NOTE — ED PROVIDER NOTE - PHYSICAL EXAMINATION
GENERAL: well appearing in no acute distress, non-toxic appearing  HEAD: normocephalic, atraumatic  HEENT: normal conjunctiva, oral mucosa moist, uvula midline, no tonsilar exudates, no JVD  CARDIAC: regular rate and rhythm, normal S1S2, no appreciable murmurs, 2+ pulses in UE/LE b/l  PULM: normal breath sounds, clear to ascultation bilaterally, no rales, rhonchi, wheezing  GI: Abd soft, nondistended, nontender, no rebound tenderness, no guarding, no rigidity  : no CVA tenderness b/l, no suprapubic tenderness  NEURO: no focal motor or sensory deficits, normal speech, normal gait, AAOx3  MSK: ROM intact, +Left back tenderness w/ left paravertebral muscle spasm.   SKIN: well-perfused, extremities warm, no visible rashes  PSYCH: appropriate mood and affect

## 2022-12-15 NOTE — ED PROVIDER NOTE - PROGRESS NOTE DETAILS
Marshal Rick PGY2: pt reassessed and pain significantly improved. CXR clear. DC. Return precautions discussed, verbal understanding demonstrated, all questions answered.

## 2022-12-15 NOTE — ED ADULT NURSE NOTE - OBJECTIVE STATEMENT
27 y.o male, A&Ox4, no PMH, pt presents to ED c/o back pain. pt states he started having back pain since 4 days ago, pt states the pain started abruptly, denies trauma or heavy lifting. pt states he was diagnosed with the flu on thursday and had some achiness since but the back pain has increased. pt states he takes tylenol which helps moderately with the pain. pt denies numbness and tingling down his legs, chest pain, sob, N/V/D, abdominal pain. pt safety and comfort provided.

## 2022-12-15 NOTE — ED ADULT TRIAGE NOTE - CHIEF COMPLAINT QUOTE
pt c/o "back spasms" in lower back x 4 days; pt reports h/o back spasms and reports "it usually happens at this time"

## 2024-04-10 NOTE — ED ADULT TRIAGE NOTE - WEIGHT IN LBS
89 FAMILY HISTORY:  Father  Still living? No  FH: CAD (coronary artery disease), Age at diagnosis: Age Unknown    Mother  Still living? No  Family history of cardiac arrhythmia, Age at diagnosis: Age Unknown  Family history of early CAD, Age at diagnosis: Age Unknown  FH: colon cancer, Age at diagnosis: Age Unknown  FH: hypertension, Age at diagnosis: Age Unknown  FH: myocardial infarction, Age at diagnosis: Age Unknown  FH: renal cell carcinoma, Age at diagnosis: Age Unknown